# Patient Record
Sex: FEMALE | Race: WHITE | NOT HISPANIC OR LATINO | Employment: OTHER | URBAN - NONMETROPOLITAN AREA
[De-identification: names, ages, dates, MRNs, and addresses within clinical notes are randomized per-mention and may not be internally consistent; named-entity substitution may affect disease eponyms.]

---

## 2017-01-16 ENCOUNTER — OFFICE VISIT (OUTPATIENT)
Dept: GASTROENTEROLOGY | Facility: CLINIC | Age: 62
End: 2017-01-16

## 2017-01-16 VITALS
HEART RATE: 82 BPM | DIASTOLIC BLOOD PRESSURE: 78 MMHG | BODY MASS INDEX: 36.96 KG/M2 | HEIGHT: 66 IN | OXYGEN SATURATION: 98 % | SYSTOLIC BLOOD PRESSURE: 159 MMHG | WEIGHT: 230 LBS

## 2017-01-16 DIAGNOSIS — K59.04 CHRONIC IDIOPATHIC CONSTIPATION: ICD-10-CM

## 2017-01-16 DIAGNOSIS — R14.0 ABDOMINAL BLOATING: ICD-10-CM

## 2017-01-16 DIAGNOSIS — R10.815 PERIUMBILICAL ABDOMINAL TENDERNESS WITHOUT REBOUND TENDERNESS: ICD-10-CM

## 2017-01-16 DIAGNOSIS — R10.12 LUQ ABDOMINAL PAIN: ICD-10-CM

## 2017-01-16 DIAGNOSIS — R10.13 DYSPEPSIA: ICD-10-CM

## 2017-01-16 DIAGNOSIS — R10.11 RUQ ABDOMINAL PAIN: Primary | ICD-10-CM

## 2017-01-16 DIAGNOSIS — R12 HEARTBURN: ICD-10-CM

## 2017-01-16 DIAGNOSIS — R10.13 EPIGASTRIC ABDOMINAL PAIN: ICD-10-CM

## 2017-01-16 DIAGNOSIS — Z86.010 HISTORY OF ADENOMATOUS POLYP OF COLON: ICD-10-CM

## 2017-01-16 PROCEDURE — 99214 OFFICE O/P EST MOD 30 MIN: CPT | Performed by: PHYSICIAN ASSISTANT

## 2017-01-16 RX ORDER — PANTOPRAZOLE SODIUM 40 MG/1
40 TABLET, DELAYED RELEASE ORAL 2 TIMES DAILY WITH MEALS
Qty: 60 TABLET | Refills: 5 | Status: SHIPPED | OUTPATIENT
Start: 2017-01-16

## 2017-01-16 RX ORDER — RANITIDINE 150 MG/1
300 TABLET ORAL 2 TIMES DAILY
Qty: 60 TABLET | Refills: 5 | Status: SHIPPED | OUTPATIENT
Start: 2017-01-16

## 2017-01-16 RX ORDER — LUBIPROSTONE 24 UG/1
24 CAPSULE ORAL 2 TIMES DAILY WITH MEALS
Qty: 32 CAPSULE | Refills: 0 | COMMUNITY
Start: 2017-01-16 | End: 2017-03-13

## 2017-01-16 NOTE — MR AVS SNAPSHOT
Sonia Nieto   1/16/2017 2:20 PM   Office Visit    Dept Phone:  [phone]   Encounter #:  [id number]    Provider:  Mirna Vang PA-C   Department:  [facility] GASTROENTEROLOGY                Your Full Care Plan              Today's Medication Changes          These changes are accurate as of: 1/16/17  1:47 PM.  If you have any questions, ask your nurse or doctor.               Medication(s)that have changed:     linaclotide 290 MCG capsule capsule   Commonly known as:  LINZESS   Take 290 mcg by mouth Daily. Take 1 tab PO once daily, 30 minutes before meals on an empty stomach.   What changed:  Another medication with the same name was removed. Continue taking this medication, and follow the directions you see here.   Changed by:  Mirna Vang PA-C                  Your Updated Medication List          This list is accurate as of: 1/16/17  1:47 PM.  Always use your most recent med list.                allopurinol 100 MG tablet   Commonly known as:  ZYLOPRIM       gabapentin 400 MG capsule   Commonly known as:  NEURONTIN       GNP GAS RELIEF 80 MG chewable tablet   Generic drug:  simethicone       linaclotide 290 MCG capsule capsule   Commonly known as:  LINZESS   Take 290 mcg by mouth Daily. Take 1 tab PO once daily, 30 minutes before meals on an empty stomach.       lisinopril 10 MG tablet   Commonly known as:  PRINIVIL,ZESTRIL       melatonin 3 MG tablet       metoprolol tartrate 25 MG tablet   Commonly known as:  LOPRESSOR       ondansetron 4 MG tablet   Commonly known as:  ZOFRAN   Take 1 tablet by mouth Every 8 (Eight) Hours As Needed for nausea or vomiting.       pantoprazole 40 MG EC tablet   Commonly known as:  PROTONIX   Take 1 tablet by mouth 2 (Two) Times a Day With Meals.       potassium chloride 10 MEQ CR tablet   Commonly known as:  K-DUR,KLOR-CON       psyllium 100 % pack packet   Commonly known as:  KONSYL   Take 1 packet by mouth Daily.       raNITIdine 150 MG tablet   Commonly known as:  ZANTAC       torsemide 20 MG tablet   Commonly known as:  DEMADEX       Vitamin B-12 ER 1000 MCG tablet controlled-release       vitamin D 09064 UNITS capsule capsule   Commonly known as:  ERGOCALCIFEROL               Instructions     None    Patient Instructions History      Upcoming Appointments     Visit Type Date Time Department    FOLLOW UP 2017  2:20 PM [facility]    FOLLOW UP 2017  2:20 PM [facility] SPEC [facility] Signup     [facility] [employer] allows you to send messages to your doctor, view your test results, renew your prescriptions, schedule appointments, and more. To sign up, go to [employer] and click on the Sign Up Now link in the New User? box. Enter your [employer] Activation Code exactly as it appears below along with the last four digits of your Social Security Number and your Date of Birth () to complete the sign-up process. If you do not sign up before the expiration date, you must request a new code.    [employer] Activation Code: [email or web address]  Expires: 2017  1:47 PM    If you have questions, you can email [email or web address] or call [phone] to talk to our [employer] staff. Remember, [employer] is NOT to be used for urgent needs. For medical emergencies, dial 911.               Other Info from Your Visit           Your Appointments     2017  2:20 PM EST   Follow Up with Mirna Vang PA-C   [facility] GASTROENTEROLOGY (--)    [address] [place] [address] [address] [place] [id number] [phone]           Arrive 15 minutes prior to appointment.            2017  2:20 PM EST   Follow Up with Mirna Vang PA-C   [facility] GASTROENTEROLOGY (--)    [address] [place] [address] [address] [place] [id number] [phone]           Arrive 15 minutes prior to appointment.              Allergies     No Known Allergies      Reason for Visit      "Follow-up Constipation      Vital Signs     Blood Pressure Pulse Height Weight Oxygen Saturation Body Mass Index    159/78 82 66\" (167.6 cm) 230 lb (104 kg) 98% 37.12 kg/m2    Smoking Status                   Former Smoker             "

## 2017-01-16 NOTE — LETTER
"2017     EUNICE Florez  [address]  [address]  [facility] [address]    Patient: Sonia Nieto   YOB: 1955   Date of Visit: 2017       Dear EUNICE Reyes:    Sonia Nieto was in my office today. Below is a copy of my note.    If you have questions, please do not hesitate to call me. I look forward to following Sonia along with you.         Sincerely,        Mirna Vang PA-C        CC: No Recipients    : 1955    Chief Complaint   Patient presents with   • Follow-up     Constipation       Sonia Nieto is a 61 y.o. female who presents to the office today for Follow-up (Constipation)      History of Present Illness:  She is taking Linzess 290 mcg 1-2 times per week and does not have a bowel movement unless she takes the medication. She does not like taking Linzess daily because it causes her to have diarrhea and she cannot leave the house. Her stools are soft now and her RUQ pain has improved some. Abdomen is still \"sore all over\" per her report and recently she has a dull pain in the LUQ which feels better after a bowel movement. She denies any vomiting. Heartburn is present and severe and she is taking gas-x 4 tabs daily, Protonix 40 mg once daily and Ranitidine 150 mg 2 tabs twice daily. Abdominal pain is present in epigastric region and radiates around her RUQ and feels bloated and burns. She had an episode after drinking coffee the other day but pain occurs on a regular basis. She tried taking Konsyl but did not like it. She has not had nausea and has not taken Zofran. She is feeling better and does not want further endoscopy or imaging studies. She complains of ear problems recently which causes her to be \"sick to her stomach\" at times.    She thought her last colonoscopy was with Dr. Kirby in  but she says she has trouble remembering. According to records, her last EGD and colonoscopy was with Dr. Kirby 2011 and she had one tubular adenomatous " colon polyp.     Liver ultrasound performed on 12/20/2016 revealed CBD at 3.4 mm, visualized pancreas normal, normal liver echogenicity without focal lesion, no ascites and no sonographic Ma's sign.     Review of Systems   Constitutional: Negative for appetite change, chills, fatigue, fever and unexpected weight change.   HENT: Positive for mouth sores. Negative for hearing loss and nosebleeds.    Eyes: Positive for itching. Negative for visual disturbance.   Respiratory: Negative for cough, chest tightness, shortness of breath and wheezing.    Cardiovascular: Positive for leg swelling. Negative for chest pain and palpitations.   Endocrine: Positive for polydipsia. Negative for cold intolerance, heat intolerance and polyuria.   Genitourinary: Negative for dysuria, frequency and hematuria.   Musculoskeletal: Negative for arthralgias, joint swelling and myalgias.   Skin: Negative for rash and wound.   Allergic/Immunologic: Positive for food allergies. Negative for immunocompromised state.   Neurological: Negative for seizures, syncope, weakness and light-headedness.   Hematological: Negative for adenopathy. Does not bruise/bleed easily.   Psychiatric/Behavioral: Negative for confusion and sleep disturbance. The patient is not nervous/anxious.    Gastrointestinal: Positive for gas/bloating and heartburn.    Past Medical History   Diagnosis Date   • Arthritis    • Diabetes mellitus    • GERD (gastroesophageal reflux disease)    • Hyperlipidemia    • Hypertension    • Thyroid disease    • Vitamin B12 deficiency    • Vitamin D deficiency        Past Surgical History   Procedure Laterality Date   • Colonoscopy       x3   • Cholecystectomy         Family History   Problem Relation Age of Onset   • Cancer Mother    • Liver cancer Father    • Colon polyps Father    • Heart disease Sister    • Breast cancer Sister        Social History     Social History   • Marital status:      Spouse name: N/A   • Number of  "children: N/A   • Years of education: N/A     Social History Main Topics   • Smoking status: Former Smoker     Types: Cigarettes     Quit date: 1975   • Smokeless tobacco: None   • Alcohol use No   • Drug use: No   • Sexual activity: Not Asked     Other Topics Concern   • None     Social History Narrative         Current Outpatient Prescriptions:   •  allopurinol (ZYLOPRIM) 100 MG tablet, , Disp: , Rfl:   •  Cyanocobalamin (VITAMIN B-12 ER) 1000 MCG tablet controlled-release, , Disp: , Rfl:   •  gabapentin (NEURONTIN) 400 MG capsule, , Disp: , Rfl:   •  GNP GAS RELIEF 80 MG chewable tablet, , Disp: , Rfl:   •  linaclotide (LINZESS) 290 MCG capsule capsule, Take 290 mcg by mouth Daily. Take 1 tab PO once daily, 30 minutes before meals on an empty stomach., Disp: 30 capsule, Rfl: 5  •  melatonin 3 MG tablet, , Disp: , Rfl:   •  metoprolol tartrate (LOPRESSOR) 25 MG tablet, , Disp: , Rfl:   •  pantoprazole (PROTONIX) 40 MG EC tablet, Take 1 tablet by mouth 2 (Two) Times a Day With Meals., Disp: 60 tablet, Rfl: 5 (She has been taking once daily)  •  potassium chloride (K-DUR,KLOR-CON) 10 MEQ CR tablet, , Disp: , Rfl:   •  raNITIdine (ZANTAC) 150 MG tablet, , Disp: , Rfl:  (She has been taking 150 mg 2 tabs BID)  •  torsemide (DEMADEX) 20 MG tablet, , Disp: , Rfl:   •  vitamin D (ERGOCALCIFEROL) 51756 UNITS capsule capsule, , Disp: , Rfl:   •  lisinopril (PRINIVIL,ZESTRIL) 10 MG tablet, , Disp: , Rfl:   •  ondansetron (ZOFRAN) 4 MG tablet, Take 1 tablet by mouth Every 8 (Eight) Hours As Needed for nausea or vomiting., Disp: 90 tablet, Rfl: 2      Allergies:   Review of patient's allergies indicates no known allergies.    Visit Vitals   • /78   • Pulse 82   • Ht 66\" (167.6 cm)   • Wt 230 lb (104 kg)   • SpO2 98%   • BMI 37.12 kg/m2       Physical Exam   Constitutional: She is oriented to person, place, and time. She appears well-developed and well-nourished. No distress.   HENT:   Head: Normocephalic and atraumatic. "   Right Ear: External ear normal.   Left Ear: External ear normal.   Nose: Nose normal.   Mouth/Throat: Oropharynx is clear and moist.   Eyes: Conjunctivae and EOM are normal. Right eye exhibits no discharge. Left eye exhibits no discharge. No scleral icterus.   Neck: Normal range of motion. Neck supple.   Cardiovascular: Normal rate, regular rhythm and normal heart sounds.  Exam reveals no gallop and no friction rub.    No murmur heard.  Pulmonary/Chest: Effort normal and breath sounds normal. No respiratory distress. She has no wheezes. She has no rales. She exhibits no tenderness.   Abdominal: Soft. Normal appearance and bowel sounds are normal. She exhibits no distension, no ascites and no mass. There is tenderness (mild epigastric and moderate periumbilical). There is no rigidity and no guarding. No hernia.   Diastasis rectus noted.   Musculoskeletal: Normal range of motion. She exhibits no edema or deformity.   Neurological: She is alert and oriented to person, place, and time. She exhibits normal muscle tone. Coordination normal.   Skin: Skin is warm and dry. No rash noted. No erythema. No pallor.   Psychiatric: She has a normal mood and affect. Her behavior is normal. Judgment and thought content normal.   Nursing note and vitals reviewed.      Assessment:  1. RUQ abdominal pain    2. Epigastric abdominal pain    3. Heartburn    4. Dyspepsia    5. Chronic idiopathic constipation    6. LUQ abdominal pain    7. Periumbilical abdominal tenderness without rebound tenderness    8. Abdominal bloating    9. History of adenomatous polyp of colon        Plan:  · RUQ abdominal pain has improved some. Liver and biliary tree ultrasound was normal. I will ask for recent labs from PCP performed today to check for elevation of AST/ALT. She may nee MRCP for further evaluation if pain persists after adequate control of bowel habits and dyspepsia.   · Epigastric abdominal pain, heartburn and dyspepsia are occurring daily but  is worse with coffee, she has noticed. She was asked to increased Protonix 40 mg twice daily but did not receive this from her pharmacy. I will re-send. Also, she has been getting better relief with Zantac 300 mg twice daily and I will send this to her pharmacy. She has Zofran if needed for nausea.  · Linzess has been causing her to have diarrhea and she has only been taking 1-2 times per week. She was directed to try Amitiza 24 mcg BID with meals for relief of CIC. Adequate control of her bowel habits may improve her other complaints. Periumbilical abdominal tenderness is consistent with CIC.   · She complains of abdominal bloating and has been taking gas-x daily. Bloating should resolve with daily bowel movements.   · Last colonoscopy with Dr. Kirby was 12/2011 when she had reported that it was in 2015. We have confirmed this with their office. She was offered a repeat colorectal cancer screening but declined at this time. She was informed of the reasons for colorectal cancer screening and importance of screenings in this time frame. She is past due (12/2016) for screening. We will place her on 3 month recall to ask her to complete this test at that time per her request.   · I discussed the patients findings and my recommendations with the patient. All of their questions were answered to their satisfaction and they understand the plan.  · She will call with any interval concerns.    Return in about 1 month (around 2/16/2017) for recheck constipation.                  Electronically signed by: Mirna Vang PA-C 1/16/2017 at 1:59 PM.        I have reviewed the documentation, treatment plan and medical decision making by Mirna Vang PA-C.       Attestation signed by: Orquidea Gilmore D.O. 1/16/2017 at 1:59 PM.

## 2017-01-16 NOTE — PROGRESS NOTES
": 1955    Chief Complaint   Patient presents with   • Follow-up     Constipation       Sonia Nieto is a 61 y.o. female who presents to the office today for Follow-up (Constipation)      History of Present Illness:  She is taking Linzess 290 mcg 1-2 times per week and does not have a bowel movement unless she takes the medication. She does not like taking Linzess daily because it causes her to have diarrhea and she cannot leave the house. Her stools are soft now and her RUQ pain has improved some. Abdomen is still \"sore all over\" per her report and recently she has a dull pain in the LUQ which feels better after a bowel movement. She denies any vomiting. Heartburn is present and severe and she is taking gas-x 4 tabs daily, Protonix 40 mg once daily and Ranitidine 150 mg 2 tabs twice daily. Abdominal pain is present in epigastric region and radiates around her RUQ and feels bloated and burns. She had an episode after drinking coffee the other day but pain occurs on a regular basis. She tried taking Konsyl but did not like it. She has not had nausea and has not taken Zofran. She is feeling better and does not want further endoscopy or imaging studies. She complains of ear problems recently which causes her to be \"sick to her stomach\" at times.    She thought her last colonoscopy was with Dr. Kirby in  but she says she has trouble remembering. According to records, her last EGD and colonoscopy was with Dr. Kirby 2011 and she had one tubular adenomatous colon polyp.     Liver ultrasound performed on 2016 revealed CBD at 3.4 mm, visualized pancreas normal, normal liver echogenicity without focal lesion, no ascites and no sonographic Ma's sign.     Review of Systems   Constitutional: Negative for appetite change, chills, fatigue, fever and unexpected weight change.   HENT: Positive for mouth sores. Negative for hearing loss and nosebleeds.    Eyes: Positive for itching. Negative for visual " disturbance.   Respiratory: Negative for cough, chest tightness, shortness of breath and wheezing.    Cardiovascular: Positive for leg swelling. Negative for chest pain and palpitations.   Endocrine: Positive for polydipsia. Negative for cold intolerance, heat intolerance and polyuria.   Genitourinary: Negative for dysuria, frequency and hematuria.   Musculoskeletal: Negative for arthralgias, joint swelling and myalgias.   Skin: Negative for rash and wound.   Allergic/Immunologic: Positive for food allergies. Negative for immunocompromised state.   Neurological: Negative for seizures, syncope, weakness and light-headedness.   Hematological: Negative for adenopathy. Does not bruise/bleed easily.   Psychiatric/Behavioral: Negative for confusion and sleep disturbance. The patient is not nervous/anxious.    Gastrointestinal: Positive for gas/bloating and heartburn.    Past Medical History   Diagnosis Date   • Arthritis    • Diabetes mellitus    • GERD (gastroesophageal reflux disease)    • Hyperlipidemia    • Hypertension    • Thyroid disease    • Vitamin B12 deficiency    • Vitamin D deficiency        Past Surgical History   Procedure Laterality Date   • Colonoscopy       x3   • Cholecystectomy         Family History   Problem Relation Age of Onset   • Cancer Mother    • Liver cancer Father    • Colon polyps Father    • Heart disease Sister    • Breast cancer Sister        Social History     Social History   • Marital status:      Spouse name: N/A   • Number of children: N/A   • Years of education: N/A     Social History Main Topics   • Smoking status: Former Smoker     Types: Cigarettes     Quit date: 1975   • Smokeless tobacco: None   • Alcohol use No   • Drug use: No   • Sexual activity: Not Asked     Other Topics Concern   • None     Social History Narrative         Current Outpatient Prescriptions:   •  allopurinol (ZYLOPRIM) 100 MG tablet, , Disp: , Rfl:   •  Cyanocobalamin (VITAMIN B-12 ER) 1000 MCG  "tablet controlled-release, , Disp: , Rfl:   •  gabapentin (NEURONTIN) 400 MG capsule, , Disp: , Rfl:   •  GNP GAS RELIEF 80 MG chewable tablet, , Disp: , Rfl:   •  linaclotide (LINZESS) 290 MCG capsule capsule, Take 290 mcg by mouth Daily. Take 1 tab PO once daily, 30 minutes before meals on an empty stomach., Disp: 30 capsule, Rfl: 5  •  melatonin 3 MG tablet, , Disp: , Rfl:   •  metoprolol tartrate (LOPRESSOR) 25 MG tablet, , Disp: , Rfl:   •  pantoprazole (PROTONIX) 40 MG EC tablet, Take 1 tablet by mouth 2 (Two) Times a Day With Meals., Disp: 60 tablet, Rfl: 5 (She has been taking once daily)  •  potassium chloride (K-DUR,KLOR-CON) 10 MEQ CR tablet, , Disp: , Rfl:   •  raNITIdine (ZANTAC) 150 MG tablet, , Disp: , Rfl:  (She has been taking 150 mg 2 tabs BID)  •  torsemide (DEMADEX) 20 MG tablet, , Disp: , Rfl:   •  vitamin D (ERGOCALCIFEROL) 32704 UNITS capsule capsule, , Disp: , Rfl:   •  lisinopril (PRINIVIL,ZESTRIL) 10 MG tablet, , Disp: , Rfl:   •  ondansetron (ZOFRAN) 4 MG tablet, Take 1 tablet by mouth Every 8 (Eight) Hours As Needed for nausea or vomiting., Disp: 90 tablet, Rfl: 2      Allergies:   Review of patient's allergies indicates no known allergies.    Visit Vitals   • /78   • Pulse 82   • Ht 66\" (167.6 cm)   • Wt 230 lb (104 kg)   • SpO2 98%   • BMI 37.12 kg/m2       Physical Exam   Constitutional: She is oriented to person, place, and time. She appears well-developed and well-nourished. No distress.   HENT:   Head: Normocephalic and atraumatic.   Right Ear: External ear normal.   Left Ear: External ear normal.   Nose: Nose normal.   Mouth/Throat: Oropharynx is clear and moist.   Eyes: Conjunctivae and EOM are normal. Right eye exhibits no discharge. Left eye exhibits no discharge. No scleral icterus.   Neck: Normal range of motion. Neck supple.   Cardiovascular: Normal rate, regular rhythm and normal heart sounds.  Exam reveals no gallop and no friction rub.    No murmur " heard.  Pulmonary/Chest: Effort normal and breath sounds normal. No respiratory distress. She has no wheezes. She has no rales. She exhibits no tenderness.   Abdominal: Soft. Normal appearance and bowel sounds are normal. She exhibits no distension, no ascites and no mass. There is tenderness (mild epigastric and moderate periumbilical). There is no rigidity and no guarding. No hernia.   Diastasis rectus noted.   Musculoskeletal: Normal range of motion. She exhibits no edema or deformity.   Neurological: She is alert and oriented to person, place, and time. She exhibits normal muscle tone. Coordination normal.   Skin: Skin is warm and dry. No rash noted. No erythema. No pallor.   Psychiatric: She has a normal mood and affect. Her behavior is normal. Judgment and thought content normal.   Nursing note and vitals reviewed.      Assessment:  1. RUQ abdominal pain    2. Epigastric abdominal pain    3. Heartburn    4. Dyspepsia    5. Chronic idiopathic constipation    6. LUQ abdominal pain    7. Periumbilical abdominal tenderness without rebound tenderness    8. Abdominal bloating    9. History of adenomatous polyp of colon        Plan:  · RUQ abdominal pain has improved some. Liver and biliary tree ultrasound was normal. I will ask for recent labs from PCP performed today to check for elevation of AST/ALT. She may nee MRCP for further evaluation if pain persists after adequate control of bowel habits and dyspepsia.   · Epigastric abdominal pain, heartburn and dyspepsia are occurring daily but is worse with coffee, she has noticed. She was asked to increased Protonix 40 mg twice daily but did not receive this from her pharmacy. I will re-send. Also, she has been getting better relief with Zantac 300 mg twice daily and I will send this to her pharmacy. She has Zofran if needed for nausea.  · Linzess has been causing her to have diarrhea and she has only been taking 1-2 times per week. She was directed to try Amitiza 24  mcg BID with meals for relief of CIC. Adequate control of her bowel habits may improve her other complaints. Periumbilical abdominal tenderness is consistent with CIC.   · She complains of abdominal bloating and has been taking gas-x daily. Bloating should resolve with daily bowel movements.   · Last colonoscopy with Dr. Kirby was 12/2011 when she had reported that it was in 2015. We have confirmed this with their office. She was offered a repeat colorectal cancer screening but declined at this time. She was informed of the reasons for colorectal cancer screening and importance of screenings in this time frame. She is past due (12/2016) for screening. We will place her on 3 month recall to ask her to complete this test at that time per her request.   · I discussed the patients findings and my recommendations with the patient. All of their questions were answered to their satisfaction and they understand the plan.  · She will call with any interval concerns.    Return in about 1 month (around 2/16/2017) for recheck constipation.                  Electronically signed by: Mirna Vang PA-C 1/16/2017 at 1:59 PM.        I have reviewed the documentation, treatment plan and medical decision making by Mirna Vang PA-C.       Attestation signed by: Orquidea Gilmore D.O. 1/16/2017 at 1:59 PM.

## 2017-01-27 ENCOUNTER — TELEPHONE (OUTPATIENT)
Dept: GASTROENTEROLOGY | Facility: CLINIC | Age: 62
End: 2017-01-27

## 2017-02-10 NOTE — TELEPHONE ENCOUNTER
Patient called and states that her amitiza was not working for her. She is back taking linzess but she isnt feeling better she is still having Right upper abdominal pain.    Is there anything else that you would like her to do?

## 2017-03-13 ENCOUNTER — OFFICE VISIT (OUTPATIENT)
Dept: GASTROENTEROLOGY | Facility: CLINIC | Age: 62
End: 2017-03-13

## 2017-03-13 ENCOUNTER — TELEPHONE (OUTPATIENT)
Dept: GASTROENTEROLOGY | Facility: CLINIC | Age: 62
End: 2017-03-13

## 2017-03-13 VITALS
HEART RATE: 72 BPM | SYSTOLIC BLOOD PRESSURE: 142 MMHG | DIASTOLIC BLOOD PRESSURE: 82 MMHG | BODY MASS INDEX: 37.93 KG/M2 | OXYGEN SATURATION: 95 % | WEIGHT: 236 LBS | HEIGHT: 66 IN

## 2017-03-13 DIAGNOSIS — Z90.49 STATUS POST CHOLECYSTECTOMY: ICD-10-CM

## 2017-03-13 DIAGNOSIS — R10.11 RUQ ABDOMINAL PAIN: Primary | ICD-10-CM

## 2017-03-13 DIAGNOSIS — R10.811 RIGHT UPPER QUADRANT ABDOMINAL TENDERNESS WITHOUT REBOUND TENDERNESS: ICD-10-CM

## 2017-03-13 DIAGNOSIS — R42 DIZZINESS: ICD-10-CM

## 2017-03-13 DIAGNOSIS — K59.00 CONSTIPATION, UNSPECIFIED CONSTIPATION TYPE: ICD-10-CM

## 2017-03-13 DIAGNOSIS — K58.1 IRRITABLE BOWEL SYNDROME WITH CONSTIPATION: ICD-10-CM

## 2017-03-13 DIAGNOSIS — R11.2 INTRACTABLE VOMITING WITH NAUSEA, UNSPECIFIED VOMITING TYPE: ICD-10-CM

## 2017-03-13 PROCEDURE — 99214 OFFICE O/P EST MOD 30 MIN: CPT | Performed by: PHYSICIAN ASSISTANT

## 2017-03-13 RX ORDER — INSULIN DETEMIR 100 [IU]/ML
80 INJECTION, SOLUTION SUBCUTANEOUS DAILY
COMMUNITY
Start: 2017-02-27

## 2017-03-13 RX ORDER — LEVOTHYROXINE SODIUM 0.12 MG/1
1 TABLET ORAL DAILY
COMMUNITY
Start: 2017-02-27

## 2017-03-13 RX ORDER — LIRAGLUTIDE 6 MG/ML
18 INJECTION SUBCUTANEOUS DAILY
COMMUNITY
Start: 2017-01-30

## 2017-03-13 RX ORDER — POLYETHYLENE GLYCOL 3350 17 G/17G
17 POWDER, FOR SOLUTION ORAL DAILY
Qty: 510 G | Refills: 5 | Status: SHIPPED | OUTPATIENT
Start: 2017-03-13

## 2017-03-13 RX ORDER — SIMVASTATIN 40 MG
1 TABLET ORAL DAILY
COMMUNITY
Start: 2017-02-27

## 2017-03-13 NOTE — PROGRESS NOTES
": 1955    Chief Complaint   Patient presents with   • Follow-up   • Constipation       Sonia Nieto is a 62 y.o. female who presents to the office today for Follow-up and Constipation      History of Present Illness:  Her bowel movements are occurring every other day without medication. She was taking Linzess 290 mcg every day previously and it was helping to relieve constipation. She was told when she had it refilled that she would have an $88 co-pay and cannot afford this. She tried Amtiza without relief and did not like it.    RUQ abdominal pain is persistent but generalized abdominal pain has improved. RUQ abdominal pain has been present for a very long time since  after her cholecystectomy. She had CT which was normal in , abd film normal in 2015 and normal liver ultrasound 2016. She had a sensation of \"something busting\" a couple of years ago in the RUQ. She had colonoscopy by Dr. Kirby in 2011 and was told it was normal. The abdominal pain has remained the same in the RUQ and as previously described, it is worse at night. She describes it as a balloon blowing up inside about to rupture. She can feel a knot under her ribs and feels the pain is getting worse and worse. After meal her pain will become intense and very uncomfortable. There is no radiation of the pain.    She felt as if she might \"pass out\" today after getting up from exam table. She last saw her PCP a couple of weeks ago. The only other time that this has occurred was when she had an inner ear infection.     Review of Systems   Constitutional: Positive for chills and fatigue. Negative for appetite change, fever and unexpected weight change.   HENT: Positive for mouth sores. Negative for hearing loss and nosebleeds.    Eyes: Negative for itching and visual disturbance.   Respiratory: Negative for cough, chest tightness, shortness of breath and wheezing.    Cardiovascular: Positive for leg swelling. Negative for chest " pain and palpitations.   Endocrine: Positive for cold intolerance. Negative for heat intolerance, polydipsia and polyuria.   Genitourinary: Negative for dysuria, frequency and hematuria.   Musculoskeletal: Negative for arthralgias, joint swelling and myalgias.   Skin: Negative for rash and wound.   Allergic/Immunologic: Positive for food allergies. Negative for immunocompromised state.   Neurological: Negative for seizures, syncope, weakness and light-headedness.   Hematological: Negative for adenopathy. Does not bruise/bleed easily.   Psychiatric/Behavioral: Negative for confusion and sleep disturbance. The patient is not nervous/anxious.    Gastrointestinal: Positive for abdominal pain, constipation, gas/bloating, heartburn, hemorrhoids, nausea and vomiting.    Past Medical History   Diagnosis Date   • Arthritis    • Diabetes mellitus    • GERD (gastroesophageal reflux disease)    • Hyperlipidemia    • Hypertension    • Thyroid disease    • Vitamin B12 deficiency    • Vitamin D deficiency        Past Surgical History   Procedure Laterality Date   • Colonoscopy       x3   • Cholecystectomy         Family History   Problem Relation Age of Onset   • Cancer Mother    • Liver cancer Father    • Colon polyps Father    • Heart disease Sister    • Breast cancer Sister        Social History     Social History   • Marital status:      Spouse name: N/A   • Number of children: N/A   • Years of education: N/A     Social History Main Topics   • Smoking status: Former Smoker     Types: Cigarettes     Quit date: 1975   • Smokeless tobacco: None   • Alcohol use No   • Drug use: No   • Sexual activity: Not Asked     Other Topics Concern   • None     Social History Narrative         Current Outpatient Prescriptions:   •  allopurinol (ZYLOPRIM) 100 MG tablet, , Disp: , Rfl:   •  Cyanocobalamin (VITAMIN B-12 ER) 1000 MCG tablet controlled-release, , Disp: , Rfl:   •  gabapentin (NEURONTIN) 400 MG capsule, , Disp: , Rfl:   •   "GNP GAS RELIEF 80 MG chewable tablet, , Disp: , Rfl:   •  LEVEMIR FLEXTOUCH 100 UNIT/ML injection, Inject 80 Units under the skin Daily., Disp: , Rfl:   •  levothyroxine (SYNTHROID, LEVOTHROID) 125 MCG tablet, Take 1 tablet by mouth Daily., Disp: , Rfl:   •  linaclotide (LINZESS) 290 MCG capsule capsule, Take 290 mcg by mouth Daily. Take 1 tab PO once daily, 30 minutes before meals on an empty stomach., Disp: 30 capsule, Rfl: 5  •  melatonin 3 MG tablet, , Disp: , Rfl:   •  metoprolol tartrate (LOPRESSOR) 25 MG tablet, 25 mg 2 (Two) Times a Day., Disp: , Rfl:   •  ondansetron (ZOFRAN) 4 MG tablet, Take 1 tablet by mouth Every 8 (Eight) Hours As Needed for nausea or vomiting., Disp: 90 tablet, Rfl: 2  •  pantoprazole (PROTONIX) 40 MG EC tablet, Take 1 tablet by mouth 2 (Two) Times a Day With Meals., Disp: 60 tablet, Rfl: 5  •  potassium chloride (K-DUR,KLOR-CON) 10 MEQ CR tablet, 10 mEq Daily., Disp: , Rfl:   •  raNITIdine (ZANTAC) 150 MG tablet, Take 2 tablets by mouth 2 (Two) Times a Day., Disp: 60 tablet, Rfl: 5  •  simvastatin (ZOCOR) 40 MG tablet, Take 1 tablet by mouth Daily., Disp: , Rfl:   •  torsemide (DEMADEX) 20 MG tablet, 20 mg Daily., Disp: , Rfl:   •  VICTOZA 18 MG/3ML solution pen-injector, Inject 18 Units under the skin Daily., Disp: , Rfl:   •  vitamin D (ERGOCALCIFEROL) 56072 UNITS capsule capsule, , Disp: , Rfl:   •  lisinopril (PRINIVIL,ZESTRIL) 10 MG tablet, , Disp: , Rfl:   •  lubiprostone (AMITIZA) 24 MCG capsule, Take 1 capsule by mouth 2 (Two) Times a Day With Meals., Disp: 32 capsule, Rfl: 0    Allergies:   Review of patient's allergies indicates no known allergies.    Visit Vitals   • /82   • Pulse 72   • Ht 66\" (167.6 cm)   • Wt 236 lb (107 kg)   • SpO2 95%   • BMI 38.09 kg/m2       Physical Exam   Constitutional: She is oriented to person, place, and time. She appears well-developed and well-nourished. No distress.   HENT:   Head: Normocephalic and atraumatic.   Right Ear: External " ear normal.   Left Ear: External ear normal.   Nose: Nose normal.   Mouth/Throat: Oropharynx is clear and moist.   Eyes: Conjunctivae and EOM are normal. Right eye exhibits no discharge. Left eye exhibits no discharge. No scleral icterus.   Neck: Normal range of motion. Neck supple.   Cardiovascular: Normal rate, regular rhythm and normal heart sounds.  Exam reveals no gallop and no friction rub.    No murmur heard.  Pulmonary/Chest: Effort normal and breath sounds normal. No respiratory distress. She has no wheezes. She has no rales. She exhibits no tenderness.   Abdominal: Soft. Normal appearance and bowel sounds are normal. She exhibits no distension, no ascites and no mass. There is tenderness (moderate generalized, worse in RUQ). There is no rigidity and no guarding. No hernia.   Diastasis rectus noted.   Musculoskeletal: Normal range of motion. She exhibits no edema or deformity.   Neurological: She is alert and oriented to person, place, and time. She exhibits normal muscle tone. Coordination normal.   Skin: Skin is warm and dry. No rash noted. No erythema. No pallor.   Psychiatric: She has a normal mood and affect. Her behavior is normal. Judgment and thought content normal.   Nursing note and vitals reviewed.  (She becomes very dizzy after abdominal exam and complains that she may pass out.)    Assessment/Plan:  Diagnoses and all orders for this visit:    RUQ abdominal pain  Intractable vomiting with nausea, unspecified vomiting type  Right upper quadrant abdominal tenderness without rebound tenderness  Status post cholecystectomy  -     CT Abdomen With & Without Contrast. CT abdomen with liver protocol will be ordered due to persistent complaints of unknown etiology. This will be scheduled and she will be called with appointment info.     Constipation, unspecified constipation type  Irritable bowel syndrome with constipation  -     She will try taking polyethylene glycol (MIRALAX) powder; Take 17 g by  mouth Daily. Take 17g PO mixed with 8 oz liquid. Can take 1-4 times daily as needed for adequate daily bowel movement.        -     linaclotide (LINZESS) 290 MCG capsule capsule; Take 290 mcg by mouth Daily. Take 1 tab PO once daily, 30 minutes before meals on an empty stomach. She was given more samples today and will use Linzess if Miralax is inadequate.    Dizziness       - She will call her PCP regarding this complaint today. She may need to be evaluated for orthostatic hypotension or inner ear pathology.      · I discussed the patients findings and my recommendations with the patient. All of their questions were answered to their satisfaction and they understand the plan.  · She will call with any interval concerns.    Return in about 1 month (around 4/13/2017) for discussion of results.                  Electronically signed by: Mirna Vang PA-C 3/13/2017 at 4:39 PM.        I have reviewed the documentation, treatment plan and medical decision making by Mirna Vang PA-C.       Attestation signed by: Orquidea Gilmore D.O. 3/13/2017 at 4:39 PM.

## 2017-03-14 ENCOUNTER — TELEPHONE (OUTPATIENT)
Dept: GASTROENTEROLOGY | Facility: CLINIC | Age: 62
End: 2017-03-14

## 2017-03-14 NOTE — TELEPHONE ENCOUNTER
V/o to pt ct abd. [facility] 3/22/17 @ 10am.  cor odc. Tried to call pt, no answer, vitaliy. sk

## 2017-03-22 ENCOUNTER — TELEPHONE (OUTPATIENT)
Dept: GASTROENTEROLOGY | Facility: CLINIC | Age: 62
End: 2017-03-22

## 2017-03-22 ENCOUNTER — HOSPITAL ENCOUNTER (OUTPATIENT)
Dept: CT IMAGING | Facility: HOSPITAL | Age: 62
Discharge: HOME | End: 2017-03-22
Admitting: PHYSICIAN ASSISTANT

## 2017-03-22 DIAGNOSIS — R10.11 RUQ ABDOMINAL PAIN: ICD-10-CM

## 2017-03-22 DIAGNOSIS — R10.811 RIGHT UPPER QUADRANT ABDOMINAL TENDERNESS WITHOUT REBOUND TENDERNESS: ICD-10-CM

## 2017-03-22 DIAGNOSIS — Z90.49 STATUS POST CHOLECYSTECTOMY: ICD-10-CM

## 2017-03-22 DIAGNOSIS — R11.2 INTRACTABLE VOMITING WITH NAUSEA, UNSPECIFIED VOMITING TYPE: ICD-10-CM

## 2017-03-22 LAB — CREAT BLDA-MCNC: 1.2 MG/DL (ref 0.6–1.3)

## 2017-03-22 PROCEDURE — 74170 CT ABD WO CNTRST FLWD CNTRST: CPT | Performed by: RADIOLOGY

## 2017-03-22 PROCEDURE — 0 IOPAMIDOL 61 % SOLUTION: Performed by: PHYSICIAN ASSISTANT

## 2017-03-22 PROCEDURE — 74170 CT ABD WO CNTRST FLWD CNTRST: CPT

## 2017-03-22 RX ORDER — POLYETHYLENE GLYCOL 3350 17 G/17G
POWDER, FOR SOLUTION ORAL
Qty: 510 G | Refills: 0 | Status: SHIPPED | OUTPATIENT
Start: 2017-03-22 | End: 2017-04-13

## 2017-03-22 RX ADMIN — IOPAMIDOL 100 ML: 612 INJECTION, SOLUTION INTRAVENOUS at 11:00

## 2017-03-22 NOTE — TELEPHONE ENCOUNTER
I called and spoke with the pt, gave her the instructions, I faxed ct to [facility]  and called to check that they got report, they had not rec yet but said it takes awhile for them to get things faxed, i will call back in the am and speak to nurse. sk

## 2017-03-22 NOTE — TELEPHONE ENCOUNTER
Impression             1. Minimal airspace disease in the right middle lobe on the very first image.  2. Moderate stool in the colon.  3. No free fluid or walled off fluid collections.      MA- please call patient regarding recent CT results. She will need to have Miralax clean out. I have sent this to her pharmacy. Please fax copy of CT report to PCP and verify receipt (discuss with a nurse) so that they can f/u on abnormal lung finding on CT.

## 2017-03-29 NOTE — TELEPHONE ENCOUNTER
Natalya from [facility] office called and confirmed recent of ct. sk            I've called the PCP several times after faxing, got different fax number bc she said their other fax was not working properly, I tried calling again today but no answer, I left a message. Also I tried calling the patient, no answer, I left message for her also. sk

## 2017-04-13 ENCOUNTER — LAB (OUTPATIENT)
Dept: LAB | Facility: HOSPITAL | Age: 62
End: 2017-04-13

## 2017-04-13 DIAGNOSIS — R10.11 CONTINUOUS RUQ ABDOMINAL PAIN: ICD-10-CM

## 2017-04-13 DIAGNOSIS — K58.1 IRRITABLE BOWEL SYNDROME WITH CONSTIPATION: ICD-10-CM

## 2017-04-13 DIAGNOSIS — R10.9 ABDOMINAL PAIN OF UNKNOWN ETIOLOGY: ICD-10-CM

## 2017-04-13 LAB
ALBUMIN SERPL-MCNC: 4 G/DL (ref 3.4–4.8)
ALBUMIN/GLOB SERPL: 1.6 G/DL (ref 1.5–2.5)
ALP SERPL-CCNC: 114 U/L (ref 35–104)
ALT SERPL W P-5'-P-CCNC: 22 U/L (ref 10–36)
ANION GAP SERPL CALCULATED.3IONS-SCNC: 1.4 MMOL/L (ref 3.6–11.2)
AST SERPL-CCNC: 20 U/L (ref 10–30)
BASOPHILS # BLD AUTO: 0.07 10*3/MM3 (ref 0–0.3)
BASOPHILS NFR BLD AUTO: 0.5 % (ref 0–2)
BILIRUB SERPL-MCNC: 0.3 MG/DL (ref 0.2–1.8)
BUN BLD-MCNC: 30 MG/DL (ref 7–21)
BUN/CREAT SERPL: 25.6 (ref 7–25)
CALCIUM SPEC-SCNC: 9.1 MG/DL (ref 7.7–10)
CHLORIDE SERPL-SCNC: 109 MMOL/L (ref 99–112)
CO2 SERPL-SCNC: 30.6 MMOL/L (ref 24.3–31.9)
CREAT BLD-MCNC: 1.17 MG/DL (ref 0.43–1.29)
DEPRECATED RDW RBC AUTO: 45.3 FL (ref 37–54)
EOSINOPHIL # BLD AUTO: 0.26 10*3/MM3 (ref 0–0.7)
EOSINOPHIL NFR BLD AUTO: 2 % (ref 0–5)
ERYTHROCYTE [DISTWIDTH] IN BLOOD BY AUTOMATED COUNT: 13.9 % (ref 11.5–14.5)
GFR SERPL CREATININE-BSD FRML MDRD: 47 ML/MIN/1.73
GLOBULIN UR ELPH-MCNC: 2.5 GM/DL
GLUCOSE BLD-MCNC: 184 MG/DL (ref 70–110)
HCT VFR BLD AUTO: 39.4 % (ref 37–47)
HGB BLD-MCNC: 12.6 G/DL (ref 12–16)
IMM GRANULOCYTES # BLD: 0.22 10*3/MM3 (ref 0–0.03)
IMM GRANULOCYTES NFR BLD: 1.7 % (ref 0–0.5)
LYMPHOCYTES # BLD AUTO: 2.62 10*3/MM3 (ref 1–3)
LYMPHOCYTES NFR BLD AUTO: 20.3 % (ref 21–51)
MCH RBC QN AUTO: 29.7 PG (ref 27–33)
MCHC RBC AUTO-ENTMCNC: 32 G/DL (ref 33–37)
MCV RBC AUTO: 92.9 FL (ref 80–94)
MONOCYTES # BLD AUTO: 0.81 10*3/MM3 (ref 0.1–0.9)
MONOCYTES NFR BLD AUTO: 6.3 % (ref 0–10)
NEUTROPHILS # BLD AUTO: 8.93 10*3/MM3 (ref 1.4–6.5)
NEUTROPHILS NFR BLD AUTO: 69.2 % (ref 30–70)
OSMOLALITY SERPL CALC.SUM OF ELEC: 292.2 MOSM/KG (ref 273–305)
PLATELET # BLD AUTO: 257 10*3/MM3 (ref 130–400)
PMV BLD AUTO: 11.6 FL (ref 6–10)
POTASSIUM BLD-SCNC: 4.1 MMOL/L (ref 3.5–5.3)
PROT SERPL-MCNC: 6.5 G/DL (ref 6–8)
RBC # BLD AUTO: 4.24 10*6/MM3 (ref 4.2–5.4)
SODIUM BLD-SCNC: 141 MMOL/L (ref 135–153)
T4 FREE SERPL-MCNC: 1.23 NG/DL (ref 0.89–1.76)
TSH SERPL DL<=0.05 MIU/L-ACNC: 2.98 MIU/ML (ref 0.55–4.78)
WBC NRBC COR # BLD: 12.91 10*3/MM3 (ref 4.5–12.5)

## 2017-04-13 PROCEDURE — 36415 COLL VENOUS BLD VENIPUNCTURE: CPT

## 2017-04-13 PROCEDURE — 84443 ASSAY THYROID STIM HORMONE: CPT

## 2017-04-13 PROCEDURE — 85025 COMPLETE CBC W/AUTO DIFF WBC: CPT

## 2017-04-13 PROCEDURE — 84439 ASSAY OF FREE THYROXINE: CPT

## 2017-04-13 PROCEDURE — 80053 COMPREHEN METABOLIC PANEL: CPT

## 2017-04-14 ENCOUNTER — TELEPHONE (OUTPATIENT)
Dept: GASTROENTEROLOGY | Facility: CLINIC | Age: 62
End: 2017-04-14

## 2017-04-14 NOTE — TELEPHONE ENCOUNTER
Please let patient know that her labs were ok, the only thing remarkable was mildly elevated WBC. She should address this with PCP because she was also complaining of ear problem which could possibly be an infection. Please forward copy of labs to PCP as well. Thanks.

## 2017-04-25 ENCOUNTER — DOCUMENTATION (OUTPATIENT)
Dept: GASTROENTEROLOGY | Facility: CLINIC | Age: 62
End: 2017-04-25

## 2017-04-25 NOTE — PROGRESS NOTES
Called pt to see if she wanted to have a colonoscopy done due to her historys. Pt stated that she did not want to have it done.